# Patient Record
Sex: MALE | URBAN - METROPOLITAN AREA
[De-identification: names, ages, dates, MRNs, and addresses within clinical notes are randomized per-mention and may not be internally consistent; named-entity substitution may affect disease eponyms.]

---

## 2025-05-06 ENCOUNTER — APPOINTMENT (OUTPATIENT)
Dept: URBAN - METROPOLITAN AREA CLINIC 341 | Facility: CLINIC | Age: 60
Setting detail: DERMATOLOGY
End: 2025-05-06

## 2025-05-06 DIAGNOSIS — L30.9 DERMATITIS, UNSPECIFIED: ICD-10-CM | Status: INADEQUATELY CONTROLLED

## 2025-05-06 DIAGNOSIS — D485 NEOPLASM OF UNCERTAIN BEHAVIOR OF SKIN: ICD-10-CM | Status: INADEQUATELY CONTROLLED

## 2025-05-06 PROBLEM — D48.5 NEOPLASM OF UNCERTAIN BEHAVIOR OF SKIN: Status: ACTIVE | Noted: 2025-05-06

## 2025-05-06 PROCEDURE — ? BIOPSY BY SHAVE METHOD

## 2025-05-06 PROCEDURE — ? PRESCRIPTION

## 2025-05-06 PROCEDURE — 11102 TANGNTL BX SKIN SINGLE LES: CPT

## 2025-05-06 PROCEDURE — ? PRESCRIPTION MEDICATION MANAGEMENT

## 2025-05-06 PROCEDURE — 99204 OFFICE O/P NEW MOD 45 MIN: CPT | Mod: 25

## 2025-05-06 PROCEDURE — ? COUNSELING

## 2025-05-06 RX ORDER — KETOCONAZOLE 20 MG/G
CREAM TOPICAL
Qty: 30 | Refills: 3 | Status: ERX | COMMUNITY
Start: 2025-05-06

## 2025-05-06 RX ADMIN — KETOCONAZOLE: 20 CREAM TOPICAL at 00:00

## 2025-05-06 ASSESSMENT — LOCATION SIMPLE DESCRIPTION DERM: LOCATION SIMPLE: LEFT CHEEK

## 2025-05-06 ASSESSMENT — LOCATION ZONE DERM: LOCATION ZONE: FACE

## 2025-05-06 ASSESSMENT — LOCATION DETAILED DESCRIPTION DERM
LOCATION DETAILED: LEFT SUPERIOR MEDIAL MALAR CHEEK
LOCATION DETAILED: LEFT SUPERIOR CENTRAL MALAR CHEEK

## 2025-05-06 NOTE — PROCEDURE: PRESCRIPTION MEDICATION MANAGEMENT
Initiate Treatment: ketoconazole 2 % topical cream - Apply to the affected area of the face twice daily x 2 weeks. Then as needed for flares.
Detail Level: Zone
Render In Strict Bullet Format?: No